# Patient Record
Sex: MALE | Race: WHITE
[De-identification: names, ages, dates, MRNs, and addresses within clinical notes are randomized per-mention and may not be internally consistent; named-entity substitution may affect disease eponyms.]

---

## 2017-02-22 ENCOUNTER — HOSPITAL ENCOUNTER (OUTPATIENT)
Dept: HOSPITAL 53 - M ADAMS | Age: 82
End: 2017-02-22
Attending: INTERNAL MEDICINE
Payer: MEDICARE

## 2017-02-22 DIAGNOSIS — K21.9: Primary | ICD-10-CM

## 2017-02-22 NOTE — REP
CHEST, PA AND LATERAL:  02/22/2017.

 

Comparison:  12/28/2016.

 

Clinical history:  Gastroesophageal reflux disease without esophagitis, increased

dyspnea for about a week.

 

Findings:  The two views show the lungs mildly hyperinflated but without pleural

effusion or acute infiltrate.  There is minor lateral pleural thickening.  No

apical scarring or pneumothorax.  Heart not grossly enlarged.  Aorta is calcified

at the arch, tortuous without aneurysm.  Airway intact.  No infiltrate, nodule or

mass in the lung fields.  Degenerative changes in the AC and glenohumeral joints

as well as the spine with demineralization, but no interval change.

 

Impression:

 

1.  No cardiomegaly, edema or effusion but tortuous calcified aorta.  No acute

cardiopulmonary change. Stable exam.

 

 

Signed by

Kaz Collins MD 02/22/2017 08:34 P

## 2017-09-04 ENCOUNTER — HOSPITAL ENCOUNTER (EMERGENCY)
Dept: HOSPITAL 53 - M ED | Age: 82
Discharge: HOME | End: 2017-09-04
Payer: MEDICARE

## 2017-09-04 VITALS — WEIGHT: 169.98 LBS | BODY MASS INDEX: 26.68 KG/M2 | HEIGHT: 67 IN

## 2017-09-04 VITALS — SYSTOLIC BLOOD PRESSURE: 192 MMHG | DIASTOLIC BLOOD PRESSURE: 82 MMHG

## 2017-09-04 DIAGNOSIS — Z91.018: ICD-10-CM

## 2017-09-04 DIAGNOSIS — J32.9: ICD-10-CM

## 2017-09-04 DIAGNOSIS — Z88.2: ICD-10-CM

## 2017-09-04 DIAGNOSIS — Z88.0: ICD-10-CM

## 2017-09-04 DIAGNOSIS — E78.9: ICD-10-CM

## 2017-09-04 DIAGNOSIS — Z88.8: ICD-10-CM

## 2017-09-04 DIAGNOSIS — Z88.5: ICD-10-CM

## 2017-09-04 DIAGNOSIS — Z79.84: ICD-10-CM

## 2017-09-04 DIAGNOSIS — Z79.82: ICD-10-CM

## 2017-09-04 DIAGNOSIS — R10.31: Primary | ICD-10-CM

## 2017-09-04 DIAGNOSIS — I10: ICD-10-CM

## 2017-09-04 DIAGNOSIS — M54.9: ICD-10-CM

## 2017-09-04 DIAGNOSIS — Z79.899: ICD-10-CM

## 2017-09-04 DIAGNOSIS — E11.9: ICD-10-CM

## 2017-09-04 LAB
ALBUMIN SERPL BCG-MCNC: 3.6 GM/DL (ref 3.2–5.2)
ALBUMIN/GLOB SERPL: 0.97 {RATIO} (ref 1–1.93)
ALP SERPL-CCNC: 60 U/L (ref 45–117)
ALT SERPL W P-5'-P-CCNC: 21 U/L (ref 12–78)
AMYLASE SERPL-CCNC: 71 U/L (ref 25–115)
ANION GAP SERPL CALC-SCNC: 8 MEQ/L (ref 8–16)
AST SERPL-CCNC: 18 U/L (ref 15–37)
BASOPHILS # BLD AUTO: 0 K/MM3 (ref 0–0.2)
BASOPHILS NFR BLD AUTO: 0.8 % (ref 0–1)
BILIRUB CONJ SERPL-MCNC: < 0.1 MG/DL (ref 0–0.2)
BILIRUB SERPL-MCNC: 0.3 MG/DL (ref 0.2–1)
BUN SERPL-MCNC: 20 MG/DL (ref 7–18)
CALCIUM SERPL-MCNC: 8.8 MG/DL (ref 8.8–10.2)
CHLORIDE SERPL-SCNC: 108 MEQ/L (ref 98–107)
CO2 SERPL-SCNC: 25 MEQ/L (ref 21–32)
CREAT SERPL-MCNC: 1.16 MG/DL (ref 0.7–1.3)
EOSINOPHIL # BLD AUTO: 0.2 K/MM3 (ref 0–0.5)
EOSINOPHIL NFR BLD AUTO: 4.5 % (ref 0–3)
ERYTHROCYTE [DISTWIDTH] IN BLOOD BY AUTOMATED COUNT: 12.4 % (ref 11.5–14.5)
GFR SERPL CREATININE-BSD FRML MDRD: > 60 ML/MIN/{1.73_M2} (ref 35–?)
GLUCOSE SERPL-MCNC: 104 MG/DL (ref 83–110)
INR PPP: 0.94
LARGE UNSTAINED CELL #: 0.2 K/MM3 (ref 0–0.4)
LARGE UNSTAINED CELL %: 2.7 % (ref 0–4)
LYMPHOCYTES # BLD AUTO: 1.2 K/MM3 (ref 1.5–4.5)
LYMPHOCYTES NFR BLD AUTO: 19.1 % (ref 24–44)
MCH RBC QN AUTO: 31 PG (ref 27–33)
MCHC RBC AUTO-ENTMCNC: 34.6 G/DL (ref 32–36.5)
MCV RBC AUTO: 89.6 FL (ref 80–96)
MONOCYTES # BLD AUTO: 0.4 K/MM3 (ref 0–0.8)
MONOCYTES NFR BLD AUTO: 6.2 % (ref 0–5)
NEUTROPHILS # BLD AUTO: 3.7 K/MM3 (ref 1.8–7.7)
NEUTROPHILS NFR BLD AUTO: 66.6 % (ref 36–66)
PLATELET # BLD AUTO: 210 K/MM3 (ref 150–450)
POTASSIUM SERPL-SCNC: 4 MEQ/L (ref 3.5–5.1)
PROT SERPL-MCNC: 7.3 GM/DL (ref 6.4–8.2)
SODIUM SERPL-SCNC: 141 MEQ/L (ref 136–145)
WBC # BLD AUTO: 5.6 K/MM3 (ref 4–10)

## 2017-09-04 PROCEDURE — 82150 ASSAY OF AMYLASE: CPT

## 2017-09-04 PROCEDURE — 93041 RHYTHM ECG TRACING: CPT

## 2017-09-04 PROCEDURE — 74177 CT ABD & PELVIS W/CONTRAST: CPT

## 2017-09-04 PROCEDURE — 85730 THROMBOPLASTIN TIME PARTIAL: CPT

## 2017-09-04 PROCEDURE — 80048 BASIC METABOLIC PNL TOTAL CA: CPT

## 2017-09-04 PROCEDURE — 36415 COLL VENOUS BLD VENIPUNCTURE: CPT

## 2017-09-04 PROCEDURE — 72110 X-RAY EXAM L-2 SPINE 4/>VWS: CPT

## 2017-09-04 PROCEDURE — 85025 COMPLETE CBC W/AUTO DIFF WBC: CPT

## 2017-09-04 PROCEDURE — 87040 BLOOD CULTURE FOR BACTERIA: CPT

## 2017-09-04 PROCEDURE — 81001 URINALYSIS AUTO W/SCOPE: CPT

## 2017-09-04 PROCEDURE — 99284 EMERGENCY DEPT VISIT MOD MDM: CPT

## 2017-09-04 PROCEDURE — 87086 URINE CULTURE/COLONY COUNT: CPT

## 2017-09-04 PROCEDURE — 83690 ASSAY OF LIPASE: CPT

## 2017-09-04 PROCEDURE — 71020: CPT

## 2017-09-04 PROCEDURE — 96375 TX/PRO/DX INJ NEW DRUG ADDON: CPT

## 2017-09-04 PROCEDURE — 85610 PROTHROMBIN TIME: CPT

## 2017-09-04 PROCEDURE — 96361 HYDRATE IV INFUSION ADD-ON: CPT

## 2017-09-04 PROCEDURE — 80076 HEPATIC FUNCTION PANEL: CPT

## 2017-09-04 PROCEDURE — 96374 THER/PROPH/DIAG INJ IV PUSH: CPT

## 2017-09-04 PROCEDURE — 73502 X-RAY EXAM HIP UNI 2-3 VIEWS: CPT

## 2017-09-04 PROCEDURE — 96376 TX/PRO/DX INJ SAME DRUG ADON: CPT

## 2017-09-04 NOTE — REP
Clinical:  Right flank pain.

 

Technique:  Axial contrast enhanced images from the lung bases to the pubic

symphysis using 100 ml Isovue 370 intravenous contrast material with coronal and

sagittal re-formations.

 

Comparison:  05/24/2016.

 

Findings:

Lung bases demonstrate chronic interstitial changes with subpleural fibrosis and

bronchiectasis.  Visualized portions of the heart and pericardium demonstrate

atherosclerotic changes without cardiomegaly, aortic aneurysm or pericardial

effusion.

 

Liver, spleen, pancreas, bilateral adrenal glands are normal.  The patient is

status post cholecystectomy.  The kidneys demonstrate bilateral cysts measuring

up to 3 cm in the right kidney and 6.8 cm in left kidney.  There is no

hydroureteronephrosis, nephroureterolithiasis or perinephric stranding.  The

enteric system demonstrates diffuse colonic and sigmoid diverticulosis without

obvious acute diverticulitis.  Pelvis demonstrates normal bladder and age

appropriate prostate/seminal vesicles.  No free air.  No free fluid.  No

significant adenopathy.  Atherosclerotic changes to the aorta and vasculature

noted without aneurysm or dissection.  Musculoskeletal structures demonstrate

age-related degenerative changes without focal osseous abnormality.

 

Impression:

1.  Bilateral renal cysts (left greater than right).

2.  Diffuse diverticulosis without acute diverticulitis.

3.  No acute abdominopelvic pathology appreciated.  Specifically no free air,

free fluid, or adenopathy and no mass lesion.

4.  Chronic changes as described above.

 

 

Signed by

Castro Leon MD 09/04/2017 01:57 P

## 2017-09-04 NOTE — REP
Clinical:  Pain.

 

Technique:  AP, lateral, bilateral oblique and coned-down views of the

lumbosacral spine.

 

Comparison:  09/14/2011.

 

Findings:

Age-related osteopenia and advanced multilevel degenerative disc osteophyte

complexes are appreciated and appear progressive when compared to prior

examination.  There is no evidence for acute fracture / compression injury or

subluxation.  Findings include endplate sclerosis, disc space narrowing,

osteophytosis and hypertrophic facet changes.

 

Impression:

Osteopenia and advanced multilevel degenerative changes.

No acute fracture / compression injury or subluxation.

 

 

Signed by

Castro Leon MD 09/04/2017 12:07 P

## 2017-09-04 NOTE — REP
Clinical:  Pain.

 

Technique:  AP view of the pelvis with neutral and frog lateral views of the

right hip.

 

Findings:

Osteopenia and moderate degenerative changes to the bilateral hips noted.

Findings include joint space narrowing, subchondral sclerosis to the acetabular

roof with associated spurring and osteophyte formation.  No obvious acute

fracture dislocation.

 

Impression:

Osteopenia and moderate degenerative changes.

No acute fracture dislocation.

 

 

Signed by

Castro Leon MD 09/04/2017 12:08 P

## 2017-09-04 NOTE — REP
Clinical:  Lower chest and abdominal pain.

 

Technique:  PA and lateral.

 

Comparison:  06/15/2015.

 

Findings:

Mediastinum and cardiac silhouette are within normal limits and stable.  Lung

fields demonstrate chronic interstitial changes primarily involving the mid to

lower lung zones with scattered calcified granulomata.  Findings are stable.  No

acute consolidation, effusion, or pneumothorax.  Skeletal structures demonstrate

age-related osteopenia and degenerative change.

 

Impression:

Chronic stable changes.  No acute cardiopulmonary process appreciated.

 

 

Signed by

Castro Leon MD 09/04/2017 12:09 P

## 2017-09-21 ENCOUNTER — HOSPITAL ENCOUNTER (OUTPATIENT)
Dept: HOSPITAL 53 - M LAB REF | Age: 82
End: 2017-09-21
Attending: SURGERY
Payer: MEDICARE

## 2017-09-21 DIAGNOSIS — C44.629: Primary | ICD-10-CM

## 2018-07-03 ENCOUNTER — HOSPITAL ENCOUNTER (OUTPATIENT)
Dept: HOSPITAL 53 - M RADPRO | Age: 83
End: 2018-07-03
Attending: ORTHOPAEDIC SURGERY
Payer: MEDICARE

## 2018-07-03 DIAGNOSIS — M16.0: Primary | ICD-10-CM

## 2018-07-03 PROCEDURE — 20610 DRAIN/INJ JOINT/BURSA W/O US: CPT

## 2018-09-26 ENCOUNTER — HOSPITAL ENCOUNTER (EMERGENCY)
Dept: HOSPITAL 53 - M ED | Age: 83
Discharge: HOME | End: 2018-09-26
Payer: MEDICARE

## 2018-09-26 DIAGNOSIS — Z79.82: ICD-10-CM

## 2018-09-26 DIAGNOSIS — Z79.84: ICD-10-CM

## 2018-09-26 DIAGNOSIS — Z88.8: ICD-10-CM

## 2018-09-26 DIAGNOSIS — Z91.018: ICD-10-CM

## 2018-09-26 DIAGNOSIS — Z79.899: ICD-10-CM

## 2018-09-26 DIAGNOSIS — Z88.5: ICD-10-CM
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
## 2018-09-26 DIAGNOSIS — Z88.0: ICD-10-CM

## 2018-09-26 DIAGNOSIS — S46.811A: Primary | ICD-10-CM

## 2018-09-26 DIAGNOSIS — E78.5: ICD-10-CM

## 2018-09-26 DIAGNOSIS — E11.9: ICD-10-CM

## 2018-09-26 DIAGNOSIS — Z88.2: ICD-10-CM

## 2018-09-26 DIAGNOSIS — I10: ICD-10-CM
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
## 2018-09-26 PROCEDURE — 73030 X-RAY EXAM OF SHOULDER: CPT

## 2018-09-26 RX ADMIN — HYDROCODONE BITARTRATE AND ACETAMINOPHEN 1 TAB: 5; 325 TABLET ORAL at 15:16

## 2019-04-28 ENCOUNTER — HOSPITAL ENCOUNTER (EMERGENCY)
Dept: HOSPITAL 53 - M ED | Age: 84
Discharge: HOME | End: 2019-04-28
Payer: MEDICARE

## 2019-04-28 VITALS — BODY MASS INDEX: 26.9 KG/M2 | WEIGHT: 167.35 LBS | HEIGHT: 66 IN

## 2019-04-28 VITALS — DIASTOLIC BLOOD PRESSURE: 62 MMHG | SYSTOLIC BLOOD PRESSURE: 142 MMHG

## 2019-04-28 DIAGNOSIS — Z91.018: ICD-10-CM

## 2019-04-28 DIAGNOSIS — E11.9: ICD-10-CM

## 2019-04-28 DIAGNOSIS — Z88.8: ICD-10-CM

## 2019-04-28 DIAGNOSIS — Z79.899: ICD-10-CM

## 2019-04-28 DIAGNOSIS — R60.9: Primary | ICD-10-CM

## 2019-04-28 DIAGNOSIS — Z79.82: ICD-10-CM

## 2019-04-28 DIAGNOSIS — Z88.1: ICD-10-CM

## 2019-04-28 DIAGNOSIS — I10: ICD-10-CM

## 2019-04-28 DIAGNOSIS — Z88.0: ICD-10-CM

## 2019-04-28 DIAGNOSIS — Z88.2: ICD-10-CM

## 2019-04-28 DIAGNOSIS — Z79.84: ICD-10-CM

## 2019-04-28 NOTE — REPVR
EXAM: 

 US Duplex Right Upper Extremity Veins, Limited 



EXAM DATE/TIME: 

 4/28/2019 9:29 PM 



CLINICAL HISTORY: 

 87 years old, male; Signs and symptoms; Edema, localized; Upper extremity, 

right; Prior surgery; Surgery date: <1 month; Surgery type: Shoulder 

replacement; Additional info: Swelling, recent surgery 



TECHNIQUE: 

 Imaging protocol: Real-time Duplex ultrasound of the Right Upper Extremity 

with 2-D gray scale, color Doppler flow and spectral waveform analysis. Limited 

exam focused on the right upper extremity veins. 



COMPARISON: 

 No relevant prior studies available. 



FINDINGS: 

 Right deep veins: Unremarkable. Axillary and brachial veins are patent 

throughout without thrombus. Normal Doppler waveforms. Normal compressibility 

and/or augmentation response. Visualized internal jugular and subclavian veins 

are patent. A portion of the right subclavian vein was not visualized secondary 

to a dressing in place.

 Right superficial veins: Unremarkable. Visualized basilic veins are patent 

without thrombus. The right cephalic vein was obscured by a dressing.



 Soft tissues: Fluid is seen around the right shoulder.



IMPRESSION: 

No evidence of deep vein thrombosis in the veins visualized in the right upper 

extremity.



Electronically signed by: [place] On 04/28/2019  22:00:51 PM

## 2019-08-12 ENCOUNTER — HOSPITAL ENCOUNTER (OUTPATIENT)
Dept: HOSPITAL 53 - M LAB REF | Age: 84
End: 2019-08-12
Attending: NURSE PRACTITIONER
Payer: MEDICARE

## 2019-08-12 DIAGNOSIS — L08.9: Primary | ICD-10-CM

## 2019-09-08 ENCOUNTER — HOSPITAL ENCOUNTER (OUTPATIENT)
Dept: HOSPITAL 53 - M WUC | Age: 84
End: 2019-09-08
Payer: MEDICARE

## 2019-09-08 DIAGNOSIS — R05: Primary | ICD-10-CM

## 2019-09-08 LAB
ALBUMIN SERPL BCG-MCNC: 3.3 GM/DL (ref 3.2–5.2)
ALT SERPL W P-5'-P-CCNC: 36 U/L (ref 12–78)
BASOPHILS # BLD AUTO: 0.1 10^3/UL (ref 0–0.2)
BASOPHILS NFR BLD AUTO: 0.3 % (ref 0–1)
BILIRUB SERPL-MCNC: 0.5 MG/DL (ref 0.2–1)
BUN SERPL-MCNC: 27 MG/DL (ref 7–18)
CALCIUM SERPL-MCNC: 9.2 MG/DL (ref 8.8–10.2)
CHLORIDE SERPL-SCNC: 105 MEQ/L (ref 98–107)
CO2 SERPL-SCNC: 26 MEQ/L (ref 21–32)
CREAT SERPL-MCNC: 1.34 MG/DL (ref 0.7–1.3)
EOSINOPHIL # BLD AUTO: 0 10^3/UL (ref 0–0.5)
EOSINOPHIL NFR BLD AUTO: 0.2 % (ref 0–3)
GFR SERPL CREATININE-BSD FRML MDRD: 53.7 ML/MIN/{1.73_M2} (ref 35–?)
GLUCOSE SERPL-MCNC: 103 MG/DL (ref 70–100)
HCT VFR BLD AUTO: 35.3 % (ref 42–52)
HGB BLD-MCNC: 11.4 G/DL (ref 13.5–17.5)
LYMPHOCYTES # BLD AUTO: 1.8 10^3/UL (ref 1.5–5)
LYMPHOCYTES NFR BLD AUTO: 10.4 % (ref 24–44)
MCH RBC QN AUTO: 29.2 PG (ref 27–33)
MCHC RBC AUTO-ENTMCNC: 32.3 G/DL (ref 32–36.5)
MCV RBC AUTO: 90.3 FL (ref 80–96)
MONOCYTES # BLD AUTO: 1.1 10^3/UL (ref 0–0.8)
MONOCYTES NFR BLD AUTO: 6.5 % (ref 0–5)
NEUTROPHILS # BLD AUTO: 14.3 10^3/UL (ref 1.5–8.5)
NEUTROPHILS NFR BLD AUTO: 82.1 % (ref 36–66)
PLATELET # BLD AUTO: 253 10^3/UL (ref 150–450)
POTASSIUM SERPL-SCNC: 4.2 MEQ/L (ref 3.5–5.1)
PROT SERPL-MCNC: 6.3 GM/DL (ref 6.4–8.2)
RBC # BLD AUTO: 3.91 10^6/UL (ref 4.3–6.1)
SODIUM SERPL-SCNC: 140 MEQ/L (ref 136–145)
WBC # BLD AUTO: 17.4 10^3/UL (ref 4–10)

## 2019-09-08 NOTE — REP
Clinical:  Cough .

 

Comparison: 09/04/2017 .

 

Technique:  PA and lateral.

 

Findings:

The mediastinum and cardiac silhouette are normal.  The lung fields demonstrate

chronic-appearing changes without acute consolidation, effusion, or pneumothorax.

The skeletal structures are intact and normal.

 

Impression:

1.   No acute cardiopulmonary process.

2.  Chronic-appearing changes.  If the patient remains symptomatic consider chest

CT for further investigation.

 

 

Electronically Signed by

Castro Leon MD 09/08/2019 04:07 P

## 2019-09-12 ENCOUNTER — HOSPITAL ENCOUNTER (OUTPATIENT)
Dept: HOSPITAL 53 - M WUC | Age: 84
End: 2019-09-12
Attending: NURSE PRACTITIONER
Payer: MEDICARE

## 2019-09-12 DIAGNOSIS — J01.90: Primary | ICD-10-CM

## 2019-09-12 LAB
ALBUMIN SERPL BCG-MCNC: 3.4 GM/DL (ref 3.2–5.2)
ALT SERPL W P-5'-P-CCNC: 23 U/L (ref 12–78)
BASOPHILS # BLD AUTO: 0 10^3/UL (ref 0–0.2)
BASOPHILS NFR BLD AUTO: 0.7 % (ref 0–1)
BILIRUB SERPL-MCNC: 0.4 MG/DL (ref 0.2–1)
BUN SERPL-MCNC: 21 MG/DL (ref 7–18)
CALCIUM SERPL-MCNC: 9.1 MG/DL (ref 8.8–10.2)
CHLORIDE SERPL-SCNC: 103 MEQ/L (ref 98–107)
CO2 SERPL-SCNC: 25 MEQ/L (ref 21–32)
CREAT SERPL-MCNC: 1.19 MG/DL (ref 0.7–1.3)
EOSINOPHIL # BLD AUTO: 0.2 10^3/UL (ref 0–0.5)
EOSINOPHIL NFR BLD AUTO: 3.9 % (ref 0–3)
GFR SERPL CREATININE-BSD FRML MDRD: > 60 ML/MIN/{1.73_M2} (ref 35–?)
GLUCOSE SERPL-MCNC: 107 MG/DL (ref 70–100)
HCT VFR BLD AUTO: 35.3 % (ref 42–52)
HGB BLD-MCNC: 11.6 G/DL (ref 13.5–17.5)
LYMPHOCYTES # BLD AUTO: 1.8 10^3/UL (ref 1.5–5)
LYMPHOCYTES NFR BLD AUTO: 30.1 % (ref 24–44)
MCH RBC QN AUTO: 30.5 PG (ref 27–33)
MCHC RBC AUTO-ENTMCNC: 32.9 G/DL (ref 32–36.5)
MCV RBC AUTO: 92.9 FL (ref 80–96)
MONOCYTES # BLD AUTO: 0.6 10^3/UL (ref 0–0.8)
MONOCYTES NFR BLD AUTO: 10.3 % (ref 0–5)
NEUTROPHILS # BLD AUTO: 3.2 10^3/UL (ref 1.5–8.5)
NEUTROPHILS NFR BLD AUTO: 54.5 % (ref 36–66)
PLATELET # BLD AUTO: 289 10^3/UL (ref 150–450)
POTASSIUM SERPL-SCNC: 4.3 MEQ/L (ref 3.5–5.1)
PROT SERPL-MCNC: 6.8 GM/DL (ref 6.4–8.2)
RBC # BLD AUTO: 3.8 10^6/UL (ref 4.3–6.1)
SODIUM SERPL-SCNC: 139 MEQ/L (ref 136–145)
WBC # BLD AUTO: 5.9 10^3/UL (ref 4–10)

## 2019-12-26 ENCOUNTER — HOSPITAL ENCOUNTER (EMERGENCY)
Dept: HOSPITAL 53 - M ED | Age: 84
Discharge: HOME | End: 2019-12-26
Payer: MEDICARE

## 2019-12-26 VITALS — HEIGHT: 62 IN | WEIGHT: 169.09 LBS | BODY MASS INDEX: 31.12 KG/M2

## 2019-12-26 VITALS — SYSTOLIC BLOOD PRESSURE: 174 MMHG | DIASTOLIC BLOOD PRESSURE: 80 MMHG

## 2019-12-26 DIAGNOSIS — Z91.018: ICD-10-CM

## 2019-12-26 DIAGNOSIS — G47.33: ICD-10-CM

## 2019-12-26 DIAGNOSIS — I10: ICD-10-CM

## 2019-12-26 DIAGNOSIS — Z88.0: ICD-10-CM

## 2019-12-26 DIAGNOSIS — E78.5: ICD-10-CM

## 2019-12-26 DIAGNOSIS — Z88.8: ICD-10-CM

## 2019-12-26 DIAGNOSIS — Z88.2: ICD-10-CM

## 2019-12-26 DIAGNOSIS — E11.9: ICD-10-CM

## 2019-12-26 DIAGNOSIS — Z88.1: ICD-10-CM

## 2019-12-26 DIAGNOSIS — L21.9: ICD-10-CM

## 2019-12-26 DIAGNOSIS — K64.8: Primary | ICD-10-CM

## 2019-12-26 DIAGNOSIS — Z79.899: ICD-10-CM

## 2019-12-26 DIAGNOSIS — Z79.84: ICD-10-CM

## 2019-12-26 DIAGNOSIS — Z79.82: ICD-10-CM

## 2019-12-26 LAB
ALBUMIN SERPL BCG-MCNC: 3.4 GM/DL (ref 3.2–5.2)
ALT SERPL W P-5'-P-CCNC: 20 U/L (ref 12–78)
BASOPHILS # BLD AUTO: 0.1 10^3/UL (ref 0–0.2)
BASOPHILS NFR BLD AUTO: 0.9 % (ref 0–1)
BILIRUB CONJ SERPL-MCNC: < 0.1 MG/DL (ref 0–0.2)
BILIRUB SERPL-MCNC: 0.3 MG/DL (ref 0.2–1)
CK MB CFR.DF SERPL CALC: 2.5
CK MB SERPL-MCNC: 2.1 NG/ML (ref ?–3.6)
CK SERPL-CCNC: 84 U/L (ref 39–308)
EOSINOPHIL # BLD AUTO: 0.6 10^3/UL (ref 0–0.5)
EOSINOPHIL NFR BLD AUTO: 8.3 % (ref 0–3)
HCT VFR BLD AUTO: 35.4 % (ref 42–52)
HGB BLD-MCNC: 11.5 G/DL (ref 13.5–17.5)
LIPASE SERPL-CCNC: 110 U/L (ref 73–393)
LYMPHOCYTES # BLD AUTO: 1.2 10^3/UL (ref 1.5–5)
LYMPHOCYTES NFR BLD AUTO: 17.7 % (ref 24–44)
MCH RBC QN AUTO: 30.1 PG (ref 27–33)
MCHC RBC AUTO-ENTMCNC: 32.5 G/DL (ref 32–36.5)
MCV RBC AUTO: 92.7 FL (ref 80–96)
MONOCYTES # BLD AUTO: 1 10^3/UL (ref 0–0.8)
MONOCYTES NFR BLD AUTO: 14.5 % (ref 0–5)
NEUTROPHILS # BLD AUTO: 4.1 10^3/UL (ref 1.5–8.5)
NEUTROPHILS NFR BLD AUTO: 58.3 % (ref 36–66)
PLATELET # BLD AUTO: 259 10^3/UL (ref 150–450)
PROT SERPL-MCNC: 6.5 GM/DL (ref 6.4–8.2)
RBC # BLD AUTO: 3.82 10^6/UL (ref 4.3–6.1)
TROPONIN I SERPL-MCNC: < 0.02 NG/ML (ref ?–0.1)
WBC # BLD AUTO: 7 10^3/UL (ref 4–10)

## 2019-12-26 NOTE — ECGEPIP
[facility] - ED

                                       

                                       Test Date:    2019

Pat Name:     BLUE ARELLANO             Department:   

Patient ID:   [id number]                 Room:         -

Gender:       Male                     Technician:   yvette

:          1931               Requested By: Vahe AQUINO

Order Number: [id number]     Reading MD:   Vahe David

                                 Measurements

Intervals                              Axis          

Rate:         77                       P:            16

ND:           235                      QRS:          -4

QRSD:         110                      T:            58

QT:           380                                    

QTc:          431                                    

                           Interpretive Statements

SINUS RHYTHM WITH FIRST DEGREE AV BLOCK

SIMILAR TO 6/15/15

Electronically Signed on 2019 21:03:22 EST by Vahe David

## 2019-12-26 NOTE — REP
Clinical:  Abdominal pain .

 

Comparison:  09/08/2019 .

 

Findings:

The mediastinum and cardiac silhouette are stable and within normal limits for

portable technique.  The lung fields are clear without acute consolidation,

effusion, or pneumothorax.  Skeletal structures are intact.

 

Impression:

No acute cardiopulmonary process appreciated.

 

 

Electronically Signed by

Castro Leon MD 12/26/2019 03:05 P

## 2020-08-23 ENCOUNTER — HOSPITAL ENCOUNTER (OUTPATIENT)
Dept: HOSPITAL 53 - M WUC | Age: 85
End: 2020-08-23
Attending: PHYSICIAN ASSISTANT
Payer: MEDICARE

## 2020-08-23 DIAGNOSIS — R06.02: ICD-10-CM

## 2020-08-23 DIAGNOSIS — R05: Primary | ICD-10-CM

## 2020-08-23 LAB
ALBUMIN SERPL BCG-MCNC: 3.4 GM/DL (ref 3.2–5.2)
ALT SERPL W P-5'-P-CCNC: 22 U/L (ref 12–78)
BASOPHILS # BLD AUTO: 0 10^3/UL (ref 0–0.2)
BASOPHILS NFR BLD AUTO: 0.2 % (ref 0–1)
BILIRUB SERPL-MCNC: 0.4 MG/DL (ref 0.2–1)
BUN SERPL-MCNC: 20 MG/DL (ref 7–18)
CALCIUM SERPL-MCNC: 8.7 MG/DL (ref 8.8–10.2)
CHLORIDE SERPL-SCNC: 107 MEQ/L (ref 98–107)
CO2 SERPL-SCNC: 28 MEQ/L (ref 21–32)
CREAT SERPL-MCNC: 1.27 MG/DL (ref 0.7–1.3)
EOSINOPHIL # BLD AUTO: 0.3 10^3/UL (ref 0–0.5)
EOSINOPHIL NFR BLD AUTO: 3.9 % (ref 0–3)
GFR SERPL CREATININE-BSD FRML MDRD: 57 ML/MIN/{1.73_M2} (ref 35–?)
GLUCOSE SERPL-MCNC: 143 MG/DL (ref 70–100)
HCT VFR BLD AUTO: 37.2 % (ref 42–52)
HGB BLD-MCNC: 12.3 G/DL (ref 13.5–17.5)
LYMPHOCYTES # BLD AUTO: 1.8 10^3/UL (ref 1.5–5)
LYMPHOCYTES NFR BLD AUTO: 22.3 % (ref 24–44)
MCH RBC QN AUTO: 31 PG (ref 27–33)
MCHC RBC AUTO-ENTMCNC: 33.1 G/DL (ref 32–36.5)
MCV RBC AUTO: 93.7 FL (ref 80–96)
MONOCYTES # BLD AUTO: 0.7 10^3/UL (ref 0–0.8)
MONOCYTES NFR BLD AUTO: 9 % (ref 0–5)
NEUTROPHILS # BLD AUTO: 5.2 10^3/UL (ref 1.5–8.5)
NEUTROPHILS NFR BLD AUTO: 64.1 % (ref 36–66)
PLATELET # BLD AUTO: 267 10^3/UL (ref 150–450)
POTASSIUM SERPL-SCNC: 4.1 MEQ/L (ref 3.5–5.1)
PROT SERPL-MCNC: 6.6 GM/DL (ref 6.4–8.2)
RBC # BLD AUTO: 3.97 10^6/UL (ref 4.3–6.1)
SODIUM SERPL-SCNC: 142 MEQ/L (ref 136–145)
WBC # BLD AUTO: 8.1 10^3/UL (ref 4–10)

## 2020-08-28 ENCOUNTER — HOSPITAL ENCOUNTER (EMERGENCY)
Dept: HOSPITAL 53 - M ED | Age: 85
Discharge: HOME | End: 2020-08-28
Payer: MEDICARE

## 2020-08-28 VITALS — DIASTOLIC BLOOD PRESSURE: 70 MMHG | SYSTOLIC BLOOD PRESSURE: 158 MMHG

## 2020-08-28 VITALS — HEIGHT: 66 IN | WEIGHT: 167.55 LBS | BODY MASS INDEX: 26.93 KG/M2

## 2020-08-28 VITALS — OXYGEN SATURATION: 96 %

## 2020-08-28 DIAGNOSIS — Z88.2: ICD-10-CM

## 2020-08-28 DIAGNOSIS — I10: ICD-10-CM

## 2020-08-28 DIAGNOSIS — Z88.0: ICD-10-CM

## 2020-08-28 DIAGNOSIS — J45.909: Primary | ICD-10-CM

## 2020-08-28 DIAGNOSIS — Z88.8: ICD-10-CM

## 2020-08-28 DIAGNOSIS — Z79.82: ICD-10-CM

## 2020-08-28 DIAGNOSIS — E78.5: ICD-10-CM

## 2020-08-28 DIAGNOSIS — Z79.899: ICD-10-CM

## 2020-08-28 DIAGNOSIS — E11.9: ICD-10-CM

## 2020-08-28 DIAGNOSIS — J02.0: ICD-10-CM

## 2020-08-28 DIAGNOSIS — Z79.84: ICD-10-CM

## 2020-08-28 DIAGNOSIS — K21.9: ICD-10-CM

## 2020-08-28 DIAGNOSIS — Z99.89: ICD-10-CM

## 2020-08-28 DIAGNOSIS — G47.33: ICD-10-CM

## 2020-08-28 DIAGNOSIS — Z88.1: ICD-10-CM

## 2020-08-28 DIAGNOSIS — Z88.5: ICD-10-CM

## 2020-08-28 LAB
ALBUMIN SERPL BCG-MCNC: 3.2 GM/DL (ref 3.2–5.2)
ALT SERPL W P-5'-P-CCNC: 25 U/L (ref 12–78)
BASOPHILS # BLD AUTO: 0 10^3/UL (ref 0–0.2)
BASOPHILS NFR BLD AUTO: 0.3 % (ref 0–1)
BILIRUB CONJ SERPL-MCNC: 0.2 MG/DL (ref 0–0.2)
BILIRUB SERPL-MCNC: 0.8 MG/DL (ref 0.2–1)
BUN SERPL-MCNC: 25 MG/DL (ref 7–18)
CALCIUM SERPL-MCNC: 9.1 MG/DL (ref 8.8–10.2)
CHLORIDE SERPL-SCNC: 105 MEQ/L (ref 98–107)
CK MB CFR.DF SERPL CALC: 3.43
CK MB SERPL-MCNC: 1.2 NG/ML (ref ?–3.6)
CK SERPL-CCNC: 35 U/L (ref 39–308)
CO2 SERPL-SCNC: 29 MEQ/L (ref 21–32)
CREAT SERPL-MCNC: 1.22 MG/DL (ref 0.7–1.3)
EOSINOPHIL # BLD AUTO: 0.2 10^3/UL (ref 0–0.5)
EOSINOPHIL NFR BLD AUTO: 1.6 % (ref 0–3)
GFR SERPL CREATININE-BSD FRML MDRD: 59.7 ML/MIN/{1.73_M2} (ref 35–?)
GLUCOSE SERPL-MCNC: 113 MG/DL (ref 70–100)
HCT VFR BLD AUTO: 37.2 % (ref 42–52)
HGB BLD-MCNC: 12.3 G/DL (ref 13.5–17.5)
LYMPHOCYTES # BLD AUTO: 1 10^3/UL (ref 1.5–5)
LYMPHOCYTES NFR BLD AUTO: 7.5 % (ref 24–44)
MCH RBC QN AUTO: 30.9 PG (ref 27–33)
MCHC RBC AUTO-ENTMCNC: 33.1 G/DL (ref 32–36.5)
MCV RBC AUTO: 93.5 FL (ref 80–96)
MONOCYTES # BLD AUTO: 1.1 10^3/UL (ref 0–0.8)
MONOCYTES NFR BLD AUTO: 7.7 % (ref 0–5)
NEUTROPHILS # BLD AUTO: 11.4 10^3/UL (ref 1.5–8.5)
NEUTROPHILS NFR BLD AUTO: 82.5 % (ref 36–66)
NT-PRO BNP: 352 PG/ML (ref ?–450)
PLATELET # BLD AUTO: 209 10^3/UL (ref 150–450)
POTASSIUM SERPL-SCNC: 4.2 MEQ/L (ref 3.5–5.1)
PROT SERPL-MCNC: 6.6 GM/DL (ref 6.4–8.2)
RBC # BLD AUTO: 3.98 10^6/UL (ref 4.3–6.1)
SODIUM SERPL-SCNC: 140 MEQ/L (ref 136–145)
TROPONIN I SERPL-MCNC: < 0.02 NG/ML (ref ?–0.1)
WBC # BLD AUTO: 13.8 10^3/UL (ref 4–10)

## 2020-09-15 NOTE — ECGEPIP
[facility] - ED

                                       

                                       Test Date:    2020

Pat Name:     BLUE ARELLANO             Department:   

Patient ID:   [id number]                 Room:         -

Gender:       Male                     Technician:   lazaro

:          1931               Requested By: OSCAR UMANA PA-C

Order Number: [id number]     Reading MD:   Dominique Delacruz

                                 Measurements

Intervals                              Axis          

Rate:         74                       P:            17

MT:           264                      QRS:          -9

QRSD:         107                      T:            53

QT:           387                                    

QTc:          431                                    

                           Interpretive Statements

SINUS RHYTHM WITH FIRST DEGREE AV BLOCK

ABNORMAL ECG

SEE SCANNED DOWNTIME REPORT

## 2020-09-15 NOTE — REP
CHEST X-RAY:  



HISTORY:  Cough and shortness of breath.  



TECHNIQUE: PA and lateral 



COMPARISON: 8/23/20



FINDINGS:

The mediastinum and cardiac silhouette are within normal limits and stable. 
Atherosclerotic changes to the thoracic aorta are again noted. The lung fields 
demonstrates stable chronic interstitial changes primarily involving the left 
base. No discrete focal consolidation, effusion or pneumothorax. Skeletal 
structures demonstrates age related osteopenia and degenerative changes along 
with right shoulder repair. 



IMPRESSION: 

Chronic stable changes. No focal consolidation or effusion.

MTDD

## 2020-09-23 ENCOUNTER — HOSPITAL ENCOUNTER (OUTPATIENT)
Dept: HOSPITAL 53 - M LAB REF | Age: 85
End: 2020-09-23
Attending: SURGERY
Payer: MEDICARE

## 2020-09-23 DIAGNOSIS — C44.622: Primary | ICD-10-CM

## 2020-09-25 NOTE — REP
CHEST X-RAY: 3-VIEWS



HISTORY: Cough and shortness of breath. 



COMPARISON: 12/26/2019.



FINDINGS: 

The lungs are symmetrically aerated and free of infiltrate. Pleural angles are 
sharp. The aorta is tortuous and calcific. The heart is not felt to be enlarged.
There are granulomatous calcifications on the right. Pulmonary vasculature is 
not increased. There are degenerative disk changes in the thoracic spine.



IMPRESSION: 

No acute disease. 

MTDD

## 2020-12-23 ENCOUNTER — HOSPITAL ENCOUNTER (OUTPATIENT)
Dept: HOSPITAL 53 - M LAB REF | Age: 85
End: 2020-12-23
Attending: PHYSICIAN ASSISTANT
Payer: MEDICARE

## 2020-12-23 DIAGNOSIS — R05: ICD-10-CM

## 2020-12-23 DIAGNOSIS — R68.83: ICD-10-CM

## 2020-12-23 DIAGNOSIS — R42: ICD-10-CM

## 2020-12-23 DIAGNOSIS — M79.10: Primary | ICD-10-CM

## 2021-03-02 ENCOUNTER — HOSPITAL ENCOUNTER (OUTPATIENT)
Dept: HOSPITAL 53 - M WUC | Age: 86
End: 2021-03-02
Attending: PHYSICIAN ASSISTANT
Payer: MEDICARE

## 2021-03-02 DIAGNOSIS — M54.6: Primary | ICD-10-CM

## 2021-03-02 NOTE — REP
INDICATION:

FALL



COMPARISON:

None.



TECHNIQUE:

AP, lateral, and swimmers views.



FINDINGS:

Age-related osteopenia and associated degenerative changes include endplate sclerosis,

scattered disc space narrowing, and osteophytosis noted.  Alignment and kyphosis

maintained.  No evidence for acute fracture/compression injury or subluxation.



IMPRESSION:

No evidence for acute fracture/compression injury or subluxation.





<Electronically signed by Castro Leon > 03/02/21 [id number]

## 2021-03-09 ENCOUNTER — HOSPITAL ENCOUNTER (EMERGENCY)
Dept: HOSPITAL 53 - M ED | Age: 86
Discharge: HOME | End: 2021-03-09
Payer: MEDICARE

## 2021-03-09 VITALS — BODY MASS INDEX: 27.07 KG/M2 | HEIGHT: 66 IN | WEIGHT: 168.43 LBS

## 2021-03-09 VITALS — DIASTOLIC BLOOD PRESSURE: 81 MMHG | SYSTOLIC BLOOD PRESSURE: 158 MMHG

## 2021-03-09 DIAGNOSIS — K21.9: ICD-10-CM

## 2021-03-09 DIAGNOSIS — I10: ICD-10-CM

## 2021-03-09 DIAGNOSIS — Z88.1: ICD-10-CM

## 2021-03-09 DIAGNOSIS — Z88.2: ICD-10-CM

## 2021-03-09 DIAGNOSIS — Z99.89: ICD-10-CM

## 2021-03-09 DIAGNOSIS — G47.33: ICD-10-CM

## 2021-03-09 DIAGNOSIS — E11.9: ICD-10-CM

## 2021-03-09 DIAGNOSIS — Z87.09: ICD-10-CM

## 2021-03-09 DIAGNOSIS — Z79.84: ICD-10-CM

## 2021-03-09 DIAGNOSIS — Z91.018: ICD-10-CM

## 2021-03-09 DIAGNOSIS — Z88.0: ICD-10-CM

## 2021-03-09 DIAGNOSIS — Z79.82: ICD-10-CM

## 2021-03-09 DIAGNOSIS — Z88.8: ICD-10-CM

## 2021-03-09 DIAGNOSIS — R09.1: Primary | ICD-10-CM

## 2021-03-09 DIAGNOSIS — Z79.899: ICD-10-CM

## 2021-03-09 LAB
BASOPHILS # BLD AUTO: 0.1 10^3/UL (ref 0–0.2)
BASOPHILS NFR BLD AUTO: 0.5 % (ref 0–1)
BUN SERPL-MCNC: 24 MG/DL (ref 7–18)
CALCIUM SERPL-MCNC: 9.1 MG/DL (ref 8.8–10.2)
CHLORIDE SERPL-SCNC: 102 MEQ/L (ref 98–107)
CK MB CFR.DF SERPL CALC: 3.57
CK MB SERPL-MCNC: 1 NG/ML (ref ?–3.6)
CK SERPL-CCNC: 28 U/L (ref 39–308)
CO2 SERPL-SCNC: 32 MEQ/L (ref 21–32)
CREAT SERPL-MCNC: 1.27 MG/DL (ref 0.7–1.3)
EOSINOPHIL # BLD AUTO: 0.3 10^3/UL (ref 0–0.5)
EOSINOPHIL NFR BLD AUTO: 3.5 % (ref 0–3)
GFR SERPL CREATININE-BSD FRML MDRD: 56.8 ML/MIN/{1.73_M2} (ref 35–?)
GLUCOSE SERPL-MCNC: 140 MG/DL (ref 70–100)
HCT VFR BLD AUTO: 39.7 % (ref 42–52)
HGB BLD-MCNC: 12.8 G/DL (ref 13.5–17.5)
LYMPHOCYTES # BLD AUTO: 2.3 10^3/UL (ref 1.5–5)
LYMPHOCYTES NFR BLD AUTO: 24.3 % (ref 24–44)
MCH RBC QN AUTO: 29.6 PG (ref 27–33)
MCHC RBC AUTO-ENTMCNC: 32.2 G/DL (ref 32–36.5)
MCV RBC AUTO: 91.7 FL (ref 80–96)
MONOCYTES # BLD AUTO: 0.9 10^3/UL (ref 0–0.8)
MONOCYTES NFR BLD AUTO: 9.1 % (ref 2–8)
NEUTROPHILS # BLD AUTO: 5.8 10^3/UL (ref 1.5–8.5)
NEUTROPHILS NFR BLD AUTO: 61.9 % (ref 36–66)
PLATELET # BLD AUTO: 326 10^3/UL (ref 150–450)
POTASSIUM SERPL-SCNC: 3.6 MEQ/L (ref 3.5–5.1)
RBC # BLD AUTO: 4.33 10^6/UL (ref 4.3–6.1)
SODIUM SERPL-SCNC: 139 MEQ/L (ref 136–145)
TROPONIN I SERPL-MCNC: < 0.02 NG/ML (ref ?–0.1)
WBC # BLD AUTO: 9.4 10^3/UL (ref 4–10)

## 2021-03-09 PROCEDURE — 71250 CT THORAX DX C-: CPT

## 2021-03-09 PROCEDURE — 84484 ASSAY OF TROPONIN QUANT: CPT

## 2021-03-09 PROCEDURE — 94760 N-INVAS EAR/PLS OXIMETRY 1: CPT

## 2021-03-09 PROCEDURE — 80048 BASIC METABOLIC PNL TOTAL CA: CPT

## 2021-03-09 PROCEDURE — 71045 X-RAY EXAM CHEST 1 VIEW: CPT

## 2021-03-09 PROCEDURE — 82553 CREATINE MB FRACTION: CPT

## 2021-03-09 PROCEDURE — 93041 RHYTHM ECG TRACING: CPT

## 2021-03-09 PROCEDURE — 96374 THER/PROPH/DIAG INJ IV PUSH: CPT

## 2021-03-09 PROCEDURE — 82550 ASSAY OF CK (CPK): CPT

## 2021-03-09 PROCEDURE — 99285 EMERGENCY DEPT VISIT HI MDM: CPT

## 2021-03-09 PROCEDURE — 93005 ELECTROCARDIOGRAM TRACING: CPT

## 2021-03-09 PROCEDURE — 85025 COMPLETE CBC W/AUTO DIFF WBC: CPT

## 2021-03-09 NOTE — REP
INDICATION:

CHEST PAIN



COMPARISON:

08/28/2020



TECHNIQUE:

Portable AP view of the chest



FINDINGS:

The mediastinum and cardiac silhouette are stable and within normal limits for

portable technique.  The lung fields demonstrate chronic appearing changes without

acute consolidation, effusion, or pneumothorax.  Skeletal structures are intact.



IMPRESSION:

No acute cardiopulmonary process appreciated.





<Electronically signed by Castro Leon > 03/09/21 [id number]

## 2021-03-09 NOTE — REP
INDICATION:

left chest pain, fall



COMPARISON:

02/23/2011



TECHNIQUE:

Axial noncontrast images from the thoracic inlet to the upper abdomen with coronal and

sagittal reformations.



This CT examination was performed using the following dose reduction techniques:

Automated exposure control, adjustment of mA and/or kv according to the patient's

size, and use of iterative reconstruction technique.



FINDINGS:

The lung fields demonstrate chronic emphysematous changes along with early subpleural

fibrosis, bronchiectasis, and few calcified granulomata.  No acute

consolidation/contusion, effusion, or pneumothorax.  Mediastinum demonstrates

atherosclerotic changes to the thoracic aorta and coronary arteries without aortic

aneurysm or cardiomegaly.  No pericardial effusion.  No significant adenopathy.



Surrounding musculoskeletal structures demonstrate age-related changes.  No acute rib

fracture or trauma/injury appreciated.



Limited upper abdomen demonstrates normal bilateral adrenal glands along with

bilateral renal cysts including incompletely imaged left exophytic cyst measuring

greater than 7 cm.



IMPRESSION:

1.   Chronic emphysematous changes and scattered scarring along with prior

granulomatous disease.  No acute mediastinal or pleuroparenchymal process appreciated.

2.  No evidence for acute rib fracture or trauma/injury.

3.  Incompletely evaluated renal cysts.





<Electronically signed by Castro Leon > 03/09/21 [id number]

## 2021-03-10 NOTE — ECGEPIP
[facility] - ED

                                       

                                       Test Date:    2021

Pat Name:     BLUE ARELLANO             Department:   

Patient ID:   [id number]                 Room:         -

Gender:       Male                     Technician:   RITA

:          1931               Requested By: ALEXSANDER TOWNSEND

Order Number: [id number]     Reading MD:   Vahe David

                                 Measurements

Intervals                              Axis          

Rate:         97                       P:            28

TX:           228                      QRS:          -1

QRSD:         98                       T:            63

QT:           362                                    

QTc:          459                                    

                           Interpretive Statements

Sinus rhythm with 1st degree AV block

SIMILAR TO 20

Electronically Signed on 3- 0:30:40 EST by Vahe David

## 2021-04-24 ENCOUNTER — HOSPITAL ENCOUNTER (OUTPATIENT)
Dept: HOSPITAL 53 - M LABSMTC | Age: 86
End: 2021-04-24
Attending: INTERNAL MEDICINE
Payer: MEDICARE

## 2021-04-24 DIAGNOSIS — Z11.52: Primary | ICD-10-CM

## 2021-05-20 ENCOUNTER — HOSPITAL ENCOUNTER (OUTPATIENT)
Dept: HOSPITAL 53 - M LAB REF | Age: 86
End: 2021-05-20
Attending: PHYSICIAN ASSISTANT
Payer: MEDICARE

## 2021-05-20 DIAGNOSIS — R19.7: Primary | ICD-10-CM

## 2021-05-20 LAB
AMYLASE SERPL-CCNC: 74 U/L (ref 25–115)
LIPASE SERPL-CCNC: 227 U/L (ref 73–393)

## 2021-06-23 ENCOUNTER — HOSPITAL ENCOUNTER (OUTPATIENT)
Dept: HOSPITAL 53 - M LAB | Age: 86
End: 2021-06-23
Attending: SURGERY
Payer: MEDICARE

## 2021-06-23 DIAGNOSIS — I65.22: ICD-10-CM

## 2021-06-23 DIAGNOSIS — D69.8: ICD-10-CM

## 2021-06-23 DIAGNOSIS — Z01.818: Primary | ICD-10-CM

## 2021-06-23 LAB
BUN SERPL-MCNC: 19 MG/DL (ref 7–18)
CREAT SERPL-MCNC: 1.16 MG/DL (ref 0.7–1.3)
GFR SERPL CREATININE-BSD FRML MDRD: > 60 ML/MIN/{1.73_M2} (ref 35–?)

## 2021-06-24 ENCOUNTER — HOSPITAL ENCOUNTER (OUTPATIENT)
Dept: HOSPITAL 53 - M RAD | Age: 86
End: 2021-06-24
Attending: SURGERY
Payer: MEDICARE

## 2021-06-24 DIAGNOSIS — I65.23: Primary | ICD-10-CM

## 2021-06-24 PROCEDURE — 70498 CT ANGIOGRAPHY NECK: CPT

## 2021-06-24 NOTE — REPVR
PROCEDURE INFORMATION: 

Exam: CT Angiography Neck With Contrast 

Exam date and time: 6/24/2021 8:20 AM 

Age: 89 years old 

Clinical indication: Other: Occlusion and stenosis of bilateral carotid 

arteries 



TECHNIQUE: 

Imaging protocol: Computed tomography angiography of the neck with contrast. 

3D rendering (Not supervised by radiologist): MIP and/or 3D reconstructed 

images were created by the technologist. 

Radiation optimization: All CT scans at this facility use at least one of these 

dose optimization techniques: automated exposure control; mA and/or kV 

adjustment per patient size (includes targeted exams where dose is matched to 

clinical indication); or iterative reconstruction. 

Contrast material: ISOVUE 370; Contrast volume: 75 ml; Contrast route: 

INTRAVENOUS (IV);  



COMPARISON: 

1. CT Chest without contrast 3/9/2021 2:22 PM 

2. CR PORTABLE CHEST X-RAY 3/9/2021 1:45 PM 



FINDINGS: 

Right common carotid artery: No stenosis. No dissection or occlusion. 

Right internal carotid artery: No stenosis of the extracranial segment. No 

dissection or occlusion. 

Right external carotid artery: No occlusion or stenosis of the origin.  



Left common carotid artery: No stenosis. No dissection or occlusion. 

Left internal carotid artery: There are calcific atherosclerotic changes of the 

left carotid bulb and proximal internal carotid artery. There is 50% stenosis 

at the origin of the left internal carotid artery. 

Left external carotid artery: No occlusion or stenosis of the origin.  



Right vertebral artery: No stenosis. No dissection or occlusion. 

Left vertebral artery: No stenosis. No dissection or occlusion. 



Soft tissues: Normal. No significant soft tissue swelling. 



Bones/joints: No acute fracture. 



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   



REFERENCES: 

NASCET CRITERIA. The degree of internal carotid artery stenosis is based on 

NASCET criteria. Normal is no stenosis. Mild is less than 50% stenosis. 

Moderate is 50-69% stenosis. Severe is 70% to 99% stenosis. Total occlusion is 

no detectable patent lumen. 



Electronically signed by: Erica Rai On 06/24/2021  14:16:14 PM

## 2021-08-31 ENCOUNTER — HOSPITAL ENCOUNTER (OUTPATIENT)
Dept: HOSPITAL 53 - M LAB REF | Age: 86
End: 2021-08-31
Attending: INTERNAL MEDICINE
Payer: MEDICARE

## 2021-08-31 DIAGNOSIS — N18.9: Primary | ICD-10-CM

## 2021-09-01 LAB
IRON SATN MFR SERPL: 43.3 % (ref 19.7–50)
IRON SERPL-MCNC: 97 UG/DL (ref 65–175)
TIBC SERPL-MCNC: 224 UG/DL (ref 250–450)

## 2021-10-15 ENCOUNTER — HOSPITAL ENCOUNTER (OUTPATIENT)
Dept: HOSPITAL 53 - M WUC | Age: 86
End: 2021-10-15
Attending: STUDENT IN AN ORGANIZED HEALTH CARE EDUCATION/TRAINING PROGRAM
Payer: MEDICARE

## 2021-10-15 DIAGNOSIS — R05.9: Primary | ICD-10-CM

## 2021-10-15 DIAGNOSIS — Z98.890: ICD-10-CM

## 2021-10-15 DIAGNOSIS — R06.00: ICD-10-CM

## 2021-10-15 NOTE — REP
INDICATION:

COUGH, DYSPNEA



COMPARISON:

03/09/2021



TECHNIQUE:

PA and lateral.



FINDINGS:

Evidence for sternotomy and CABG.  Cardiac silhouette is normal.  Lung fields

demonstrate chronic appearing interstitial changes.  No obvious focal consolidation,

effusion, or pneumothorax.  Skeletal structures demonstrate age-related osteopenia and

degenerative changes along with right shoulder replacement.



IMPRESSION:

Chronic appearing changes.  No acute consolidation or effusion identified.





<Electronically signed by Castro Leon > 10/15/21 [id number]